# Patient Record
Sex: FEMALE | Race: BLACK OR AFRICAN AMERICAN | Employment: PART TIME | URBAN - METROPOLITAN AREA
[De-identification: names, ages, dates, MRNs, and addresses within clinical notes are randomized per-mention and may not be internally consistent; named-entity substitution may affect disease eponyms.]

---

## 2020-02-03 ENCOUNTER — OFFICE VISIT (OUTPATIENT)
Dept: OBGYN CLINIC | Age: 20
End: 2020-02-03

## 2020-02-03 ENCOUNTER — HOSPITAL ENCOUNTER (OUTPATIENT)
Dept: LAB | Age: 20
Discharge: HOME | End: 2020-02-03
Payer: COMMERCIAL

## 2020-02-03 VITALS
RESPIRATION RATE: 19 BRPM | HEIGHT: 66 IN | WEIGHT: 187 LBS | DIASTOLIC BLOOD PRESSURE: 75 MMHG | SYSTOLIC BLOOD PRESSURE: 113 MMHG | HEART RATE: 90 BPM | BODY MASS INDEX: 30.05 KG/M2

## 2020-02-03 DIAGNOSIS — Z3A.18 PREGNANCY WITH 18 COMPLETED WEEKS GESTATION: ICD-10-CM

## 2020-02-03 DIAGNOSIS — Z3A.18 PREGNANCY WITH 18 COMPLETED WEEKS GESTATION: Primary | ICD-10-CM

## 2020-02-03 PROBLEM — O09.32 LATE PRENATAL CARE AFFECTING PREGNANCY IN SECOND TRIMESTER: Status: ACTIVE | Noted: 2020-02-03

## 2020-02-03 LAB
ABO + RH BLD: NORMAL
ANTIBODY SCREEN, EXTERNAL: NEGATIVE
BASOPHILS # BLD: 0 K/UL (ref 0–0.1)
BASOPHILS NFR BLD: 0 % (ref 0–2)
BLOOD GROUP ANTIBODIES SERPL: NORMAL
DIFFERENTIAL METHOD BLD: NORMAL
EOSINOPHIL # BLD: 0.2 K/UL (ref 0–0.4)
EOSINOPHIL NFR BLD: 2 % (ref 0–5)
ERYTHROCYTE [DISTWIDTH] IN BLOOD BY AUTOMATED COUNT: 13.5 % (ref 11.6–14.5)
HBSAG, EXTERNAL: NEGATIVE
HCT VFR BLD AUTO: 39.6 % (ref 35–45)
HCT, EXTERNAL: 39.6
HGB BLD-MCNC: 13.6 G/DL (ref 12–16)
HGB EVAL, EXTERNAL: NEGATIVE
HGB, EXTERNAL: 13.6
HIV, EXTERNAL: NORMAL
LYMPHOCYTES # BLD: 3.2 K/UL (ref 0.9–3.6)
LYMPHOCYTES NFR BLD: 29 % (ref 21–52)
MCH RBC QN AUTO: 30.3 PG (ref 24–34)
MCHC RBC AUTO-ENTMCNC: 34.3 G/DL (ref 31–37)
MCV RBC AUTO: 88.2 FL (ref 74–97)
MONOCYTES # BLD: 1.1 K/UL (ref 0.05–1.2)
MONOCYTES NFR BLD: 10 % (ref 3–10)
NEUTS SEG # BLD: 6.7 K/UL (ref 1.8–8)
NEUTS SEG NFR BLD: 59 % (ref 40–73)
PLATELET # BLD AUTO: 330 K/UL (ref 135–420)
PLATELET CNT,   EXTERNAL: 330
PMV BLD AUTO: 10.6 FL (ref 9.2–11.8)
RBC # BLD AUTO: 4.49 M/UL (ref 4.2–5.3)
RPR, EXTERNAL: NORMAL
RUBELLA, EXTERNAL: NORMAL
SPECIMEN EXP DATE BLD: NORMAL
T. PALLIDUM, EXTERNAL: NORMAL
TYPE, ABO & RH, EXTERNAL: NORMAL
URINALYSIS, EXTERNAL: NO GROWTH
WBC # BLD AUTO: 11.2 K/UL (ref 4.6–13.2)

## 2020-02-03 PROCEDURE — 86900 BLOOD TYPING SEROLOGIC ABO: CPT

## 2020-02-03 PROCEDURE — 36415 COLL VENOUS BLD VENIPUNCTURE: CPT

## 2020-02-03 PROCEDURE — 83021 HEMOGLOBIN CHROMOTOGRAPHY: CPT

## 2020-02-03 PROCEDURE — 87389 HIV-1 AG W/HIV-1&-2 AB AG IA: CPT

## 2020-02-03 PROCEDURE — 85025 COMPLETE CBC W/AUTO DIFF WBC: CPT

## 2020-02-03 PROCEDURE — 87086 URINE CULTURE/COLONY COUNT: CPT

## 2020-02-03 PROCEDURE — 86780 TREPONEMA PALLIDUM: CPT

## 2020-02-03 PROCEDURE — 86762 RUBELLA ANTIBODY: CPT

## 2020-02-03 PROCEDURE — 87340 HEPATITIS B SURFACE AG IA: CPT

## 2020-02-03 NOTE — LETTER
[facility] DENTAL CLEARANCE LETTER 
 
 
 
 
 
2/3/2020 Luis Luna 2000 To whom it may concern: 
 
 
Luis Luna is an obstetric patient in my practice. She is cleared to have dental work with a double shield x-ray, lidocaine, and amoxicillin for treatment; and Tylenol #3 or Vicodin for pain. Should you require additional information please contact our office at [phone]. Sincerely, Hany Coleman MD

## 2020-02-03 NOTE — PROGRESS NOTES
24 y/o  presents to the office for missed menses. She states she has a known LMP and hasn't sought care anywhere else until now. She denies fetal movement, bleeding or pain. She is taking a PNV. Visit Vitals  /75   Pulse 90   Resp 19   Ht 5' 5.5\" (1.664 m)   Wt 187 lb (84.8 kg)   LMP 10/03/2019 (Exact Date)   Breastfeeding No   BMI 30.65 kg/m²     Pt declined exam and would like to have it done next visit. Will plan for morph at 20 weeks. Order placed. RTO 2 weeks.

## 2020-02-04 LAB
HBV SURFACE AG SER QL: <0.1 INDEX
HBV SURFACE AG SER QL: NEGATIVE
HIV 1+2 AB+HIV1 P24 AG SERPL QL IA: NONREACTIVE
HIV12 RESULT COMMENT, HHIVC: NORMAL
RUBV IGG SER-IMP: NORMAL
T PALLIDUM AB SER QL IA: NONREACTIVE

## 2020-02-05 LAB
BACTERIA SPEC CULT: NORMAL
DEPRECATED HGB OTHER BLD-IMP: 0 %
HGB A MFR BLD: 97.5 % (ref 96.4–98.8)
HGB A2 MFR BLD COLUMN CHROM: 2.5 % (ref 1.8–3.2)
HGB C MFR BLD: 0 %
HGB F MFR BLD: 0 % (ref 0–2)
HGB FRACT BLD-IMP: NORMAL
HGB S BLD QL SOLY: NEGATIVE
HGB S MFR BLD: 0 %
SERVICE CMNT-IMP: NORMAL

## 2020-02-21 ENCOUNTER — HOSPITAL ENCOUNTER (OUTPATIENT)
Dept: LAB | Age: 20
Discharge: HOME | End: 2020-02-21
Payer: COMMERCIAL

## 2020-02-21 ENCOUNTER — ROUTINE PRENATAL (OUTPATIENT)
Dept: OBGYN CLINIC | Age: 20
End: 2020-02-21

## 2020-02-21 VITALS
WEIGHT: 190 LBS | HEIGHT: 66 IN | RESPIRATION RATE: 17 BRPM | TEMPERATURE: 98.8 F | BODY MASS INDEX: 30.53 KG/M2 | DIASTOLIC BLOOD PRESSURE: 80 MMHG | SYSTOLIC BLOOD PRESSURE: 129 MMHG | HEART RATE: 97 BPM

## 2020-02-21 DIAGNOSIS — Z3A.20 20 WEEKS GESTATION OF PREGNANCY: ICD-10-CM

## 2020-02-21 DIAGNOSIS — Z3A.20 20 WEEKS GESTATION OF PREGNANCY: Primary | ICD-10-CM

## 2020-02-21 DIAGNOSIS — Z34.82 ENCOUNTER FOR SUPERVISION OF OTHER NORMAL PREGNANCY IN SECOND TRIMESTER: ICD-10-CM

## 2020-02-21 PROCEDURE — 87661 TRICHOMONAS VAGINALIS AMPLIF: CPT

## 2020-02-21 NOTE — PROGRESS NOTES
PRENATAL INTAKE SUMMARY    Ms. Jt Edmonds presents today for her first prenatal visit. She has no concerns. She states she does feel fetal movement. She had a confirmatory ultrasound last visit as well as labs. She is a late entrant to care. OB History        2    Para        Term                AB   1    Living           SAB        TAB   1    Ectopic        Molar        Multiple        Live Births                  I have reviewed the patient's medical, obstetrical, social, and family histories, medications, and available lab results. Subjective:   She has no unusual complaints    Objective:   Initial Physical Exam (New OB)    GENERAL APPEARANCE: alert, well appearing, in no apparent distress  HEAD: normocephalic, atraumatic  MOUTH: mucous membranes moist, pharynx normal without lesions  THYROID: no thyromegaly or masses present  BREASTS: no masses noted, no significant tenderness, no palpable axillary nodes, no skin changes  LUNGS: clear to auscultation, no wheezes, rales or rhonchi, symmetric air entry  HEART: regular rate and rhythm, no murmurs  ABDOMEN: soft, nontender, nondistended, no abnormal masses, no epigastric pain and FHT present  EXTREMITIES: no redness or tenderness in the calves or thighs, no edema  SKIN: normal coloration and turgor, no rashes  LYMPH NODES: no adenopathy palpable  NEUROLOGIC: alert, oriented, normal speech, no focal findings or movement disorder noted    PELVIC EXAM: EXTERNAL GENITALIA: normal appearing vulva with no masses, tenderness or lesions  VAGINA: no abnormal discharge or lesions  CERVIX: no lesions or cervical motion tenderness  UTERUS: gravid and consistent with 20 weeks  ADNEXA: no masses palpable and nontender  OB EXAM PELVIMETRY: appears adequate    Assessment/Plan:   Normal pregnancy  PNLs reviewed and normal.  Pt will consider genetic testing. Morph scheduled for   RTO 4 weeks.     Routine Prenatal care    ICD-10-CM ICD-9-CM    1. 20 weeks gestation of pregnancy Z3A.20 V22.2 CHLAMYDIA/NEISSERIA/TRICHOMONAS AMP

## 2020-02-24 LAB
C TRACH RRNA SPEC QL NAA+PROBE: NEGATIVE
N GONORRHOEA RRNA SPEC QL NAA+PROBE: NEGATIVE
SPECIMEN SOURCE: NORMAL
T VAGINALIS RRNA SPEC QL NAA+PROBE: NEGATIVE

## 2020-03-20 ENCOUNTER — ROUTINE PRENATAL (OUTPATIENT)
Dept: OBGYN CLINIC | Age: 20
End: 2020-03-20

## 2020-03-20 VITALS
HEART RATE: 89 BPM | RESPIRATION RATE: 20 BRPM | SYSTOLIC BLOOD PRESSURE: 136 MMHG | WEIGHT: 199 LBS | DIASTOLIC BLOOD PRESSURE: 77 MMHG | BODY MASS INDEX: 31.98 KG/M2 | HEIGHT: 66 IN

## 2020-03-20 DIAGNOSIS — Z3A.24 PREGNANCY WITH 24 COMPLETED WEEKS GESTATION: Primary | ICD-10-CM

## 2020-03-20 NOTE — LETTER
.Pregnancy Confirmation Letter [facility]20/[address] [facility] [facility] [address] [id number] Kalin Reyes is an OB patient at [facility]. LMP was 10. 3.2019 with an @[facility] 7-9-2020. She is 24.1 weeks pregnant on this date. Sincerely, Aurora De La Rosa MD

## 2020-03-20 NOTE — PROGRESS NOTES
Chief Complaint   Patient presents with    Routine Prenatal Visit     Visit Vitals  /77   Pulse 89   Resp 20   Ht 5' 5.5\" (1.664 m)   Wt 199 lb (90.3 kg)   LMP 10/03/2019 (Exact Date)   BMI 32.61 kg/m²     Results for orders placed or performed during the hospital encounter of 02/21/20   CHLAMYDIA/NEISSERIA/TRICHOMONAS AMP   Result Value Ref Range    Chlamydia amplification NEGATIVE  NEG      N. gonorrhoeae amplification NEGATIVE  NEG      Trichomonas amplification NEGATIVE  NEG      Specimen Source VAGINAL       FLU VACCINE OFFERED ,PATIENT DECLINED

## 2020-03-20 NOTE — PROGRESS NOTES
24w1d.  No Ob issues. Denies LOF/VB  Noted weight gain- discussed dietary choices. Glucola and CBC next visit. PNLs reviewed and normal.  RTO 4 weeks.

## 2020-03-24 ENCOUNTER — TELEPHONE (OUTPATIENT)
Dept: OBGYN CLINIC | Age: 20
End: 2020-03-24

## 2020-03-24 ENCOUNTER — ROUTINE PRENATAL (OUTPATIENT)
Dept: OBGYN CLINIC | Age: 20
End: 2020-03-24

## 2020-03-24 VITALS
RESPIRATION RATE: 18 BRPM | HEIGHT: 66 IN | TEMPERATURE: 98.4 F | HEART RATE: 76 BPM | SYSTOLIC BLOOD PRESSURE: 119 MMHG | DIASTOLIC BLOOD PRESSURE: 68 MMHG | WEIGHT: 199 LBS | BODY MASS INDEX: 31.98 KG/M2

## 2020-03-24 DIAGNOSIS — O46.92 VAGINAL BLEEDING IN PREGNANCY, SECOND TRIMESTER: Primary | ICD-10-CM

## 2020-03-24 NOTE — PROGRESS NOTES
22 y/o GP0 @ 24w5d presents to the office as an emergent visit. She was seen at [facility] earlier today with complaint of painless vaginal bleeding. She states she woke up to a clot when she wiped and saw more in the toliet. She had a normal NST and was discharged. She denies any sexual activity, trauma, MVA or un-usual activity. Visit Vitals  /68   Pulse 76   Temp 98.4 °F (36.9 °C) (Oral)   Resp 18   Ht 5' 5.5\" (1.664 m)   Wt 199 lb (90.3 kg)   LMP 10/03/2019 (Exact Date)   BMI 32.61 kg/m²     SVE: NEFG, no blood on perineum. Noted normal appearing closed cervix. No blood noted. Pink tinged discharge noted on q-tip provocation. BME: cervix is closed    Ultrasound: single IUP; placenta anterior and fundal.    Cervix seen and 3.81 cm    A/P: Resolved vaginal bleeding at 24w5d. Placenta appears normal.  Bleeding most likely cervical.  Will monitor conservatively. Recommend pelvic rest for 1 week. RTO if bleeding recurrs. RTO as scheduled.

## 2020-03-24 NOTE — TELEPHONE ENCOUNTER
Patient called to notify her provider that she heading to [facility] because she is bleeding

## 2020-04-20 ENCOUNTER — ROUTINE PRENATAL (OUTPATIENT)
Dept: OBGYN CLINIC | Age: 20
End: 2020-04-20

## 2020-04-20 ENCOUNTER — HOSPITAL ENCOUNTER (OUTPATIENT)
Dept: LAB | Age: 20
Discharge: HOME | End: 2020-04-20
Payer: COMMERCIAL

## 2020-04-20 VITALS
HEIGHT: 65 IN | OXYGEN SATURATION: 99 % | TEMPERATURE: 99.4 F | WEIGHT: 206.4 LBS | HEART RATE: 97 BPM | RESPIRATION RATE: 20 BRPM | DIASTOLIC BLOOD PRESSURE: 81 MMHG | SYSTOLIC BLOOD PRESSURE: 124 MMHG | BODY MASS INDEX: 34.39 KG/M2

## 2020-04-20 DIAGNOSIS — Z3A.28 28 WEEKS GESTATION OF PREGNANCY: ICD-10-CM

## 2020-04-20 DIAGNOSIS — Z3A.28 28 WEEKS GESTATION OF PREGNANCY: Primary | ICD-10-CM

## 2020-04-20 LAB
BASOPHILS # BLD: 0 K/UL (ref 0–0.1)
BASOPHILS NFR BLD: 0 % (ref 0–2)
DIFFERENTIAL METHOD BLD: ABNORMAL
EOSINOPHIL # BLD: 0.1 K/UL (ref 0–0.4)
EOSINOPHIL NFR BLD: 1 % (ref 0–5)
ERYTHROCYTE [DISTWIDTH] IN BLOOD BY AUTOMATED COUNT: 13.3 % (ref 11.6–14.5)
GLUCOSE 1H P 100 G GLC PO SERPL-MCNC: 100 MG/DL (ref 60–140)
HCT VFR BLD AUTO: 37.4 % (ref 35–45)
HGB BLD-MCNC: 12.6 G/DL (ref 12–16)
LYMPHOCYTES # BLD: 2.2 K/UL (ref 0.9–3.6)
LYMPHOCYTES NFR BLD: 20 % (ref 21–52)
MCH RBC QN AUTO: 30.9 PG (ref 24–34)
MCHC RBC AUTO-ENTMCNC: 33.7 G/DL (ref 31–37)
MCV RBC AUTO: 91.7 FL (ref 74–97)
MONOCYTES # BLD: 0.7 K/UL (ref 0.05–1.2)
MONOCYTES NFR BLD: 7 % (ref 3–10)
NEUTS SEG # BLD: 7.7 K/UL (ref 1.8–8)
NEUTS SEG NFR BLD: 72 % (ref 40–73)
PLATELET # BLD AUTO: 336 K/UL (ref 135–420)
PMV BLD AUTO: 10.4 FL (ref 9.2–11.8)
RBC # BLD AUTO: 4.08 M/UL (ref 4.2–5.3)
WBC # BLD AUTO: 10.7 K/UL (ref 4.6–13.2)

## 2020-04-20 PROCEDURE — 85025 COMPLETE CBC W/AUTO DIFF WBC: CPT

## 2020-04-20 PROCEDURE — 36415 COLL VENOUS BLD VENIPUNCTURE: CPT

## 2020-04-20 PROCEDURE — 82950 GLUCOSE TEST: CPT

## 2020-04-20 NOTE — PROGRESS NOTES
Honey Feldman presents today for   Chief Complaint   Patient presents with    Routine Prenatal Visit       Is someone accompanying this pt? no  Patient does not have any concerns at this time. Patient no birth control at this time . Depression screening     Is the patient using any DME equipment during OV? no    Depression Screening:  No flowsheet data found. Learning Assessment:  Learning Assessment 2/3/2020   PRIMARY LEARNER Patient   BARRIERS PRIMARY LEARNER NONE   CO-LEARNER CAREGIVER No   PRIMARY LANGUAGE ENGLISH   LEARNER PREFERENCE PRIMARY LISTENING   ANSWERED BY PATIENT   RELATIONSHIP SELF       Abuse Screening:  No flowsheet data found. Fall Risk  No flowsheet data found. This Visit Test  Results for orders placed or performed during the hospital encounter of 02/21/20   CHLAMYDIA/NEISSERIA/TRICHOMONAS AMP   Result Value Ref Range    Chlamydia amplification NEGATIVE  NEG      N. gonorrhoeae amplification NEGATIVE  NEG      Trichomonas amplification NEGATIVE  NEG      Specimen Source VAGINAL         Health Maintenance reviewed and discussed and ordered per Provider. Health Maintenance Due   Topic Date Due    HPV Age 9Y-34Y (1 - 2-dose series) 12/23/2011    OB 3RD TRIMESTER TDAP  04/09/2020   . Coordination of Care:  1. Have you been to the ER, urgent care clinic since your last visit? Hospitalized since your last visit? no    2. Have you seen or consulted any other health care providers outside of the [address] [place] [address] since your last visit? Include any pap smears or colon screening.  no  Visit Vitals  Resp 20   Ht 5' 5\" (1.651 m)   LMP 10/03/2019 (Exact Date)   SpO2 99%   BMI 33.12 kg/m²     206.4lbs

## 2020-05-04 ENCOUNTER — ROUTINE PRENATAL (OUTPATIENT)
Dept: OBGYN CLINIC | Age: 20
End: 2020-05-04

## 2020-05-04 VITALS
RESPIRATION RATE: 18 BRPM | BODY MASS INDEX: 35.16 KG/M2 | DIASTOLIC BLOOD PRESSURE: 76 MMHG | SYSTOLIC BLOOD PRESSURE: 122 MMHG | HEART RATE: 87 BPM | WEIGHT: 211 LBS | HEIGHT: 65 IN | OXYGEN SATURATION: 98 % | TEMPERATURE: 95.6 F

## 2020-05-04 DIAGNOSIS — Z3A.30 PREGNANCY WITH 30 COMPLETED WEEKS GESTATION: Primary | ICD-10-CM

## 2020-05-04 NOTE — PROGRESS NOTES
Chief Complaint   Patient presents with    Routine Prenatal Visit     rm 3     Visit Vitals  /76   Pulse 87   Temp 95.6 °F (35.3 °C) (Tympanic)   Resp 18   Ht 5' 5\" (1.651 m)   Wt 211 lb (95.7 kg)   LMP 10/03/2019 (Exact Date)   SpO2 98%   BMI 35.11 kg/m²     Results for orders placed or performed during the hospital encounter of 04/20/20   GLUCOSE, GESTATIONAL 1 HR TOLERANCE   Result Value Ref Range    GESTATIONAL GLUC 1HR 100 60 - 140 MG/DL   CBC WITH AUTOMATED DIFF   Result Value Ref Range    WBC 10.7 4.6 - 13.2 K/uL    RBC 4.08 (L) 4.20 - 5.30 M/uL    HGB 12.6 12.0 - 16.0 g/dL    HCT 37.4 35.0 - 45.0 %    MCV 91.7 74.0 - 97.0 FL    MCH 30.9 24.0 - 34.0 PG    MCHC 33.7 31.0 - 37.0 g/dL    RDW 13.3 11.6 - 14.5 %    PLATELET 964 687 - 444 K/uL    MPV 10.4 9.2 - 11.8 FL    NEUTROPHILS 72 40 - 73 %    LYMPHOCYTES 20 (L) 21 - 52 %    MONOCYTES 7 3 - 10 %    EOSINOPHILS 1 0 - 5 %    BASOPHILS 0 0 - 2 %    ABS. NEUTROPHILS 7.7 1.8 - 8.0 K/UL    ABS. LYMPHOCYTES 2.2 0.9 - 3.6 K/UL    ABS. MONOCYTES 0.7 0.05 - 1.2 K/UL    ABS. EOSINOPHILS 0.1 0.0 - 0.4 K/UL    ABS. BASOPHILS 0.0 0.0 - 0.1 K/UL    DF AUTOMATED      1. Have you been to the ER, urgent care clinic since your last visit? Hospitalized since your last visit? No    2. Have you seen or consulted any other health care providers outside of the [address] [place] [address] since your last visit? Include any pap smears or colon screening.  No

## 2020-05-04 NOTE — PATIENT INSTRUCTIONS

## 2020-05-26 ENCOUNTER — ROUTINE PRENATAL (OUTPATIENT)
Dept: OBGYN CLINIC | Age: 20
End: 2020-05-26

## 2020-05-26 VITALS
DIASTOLIC BLOOD PRESSURE: 84 MMHG | WEIGHT: 215 LBS | BODY MASS INDEX: 35.82 KG/M2 | SYSTOLIC BLOOD PRESSURE: 136 MMHG | HEIGHT: 65 IN | HEART RATE: 110 BPM

## 2020-05-26 DIAGNOSIS — Z3A.33 PREGNANCY WITH 33 COMPLETED WEEKS GESTATION: Primary | ICD-10-CM

## 2020-05-26 NOTE — PROGRESS NOTES
Chief Complaint   Patient presents with    Routine Prenatal Visit     Visit Vitals  /84   Pulse (!) 110   Ht 5' 5\" (1.651 m)   Wt 215 lb (97.5 kg)   LMP 10/03/2019 (Exact Date)   BMI 35.78 kg/m²     Results for orders placed or performed during the hospital encounter of 04/20/20   GLUCOSE, GESTATIONAL 1 HR TOLERANCE   Result Value Ref Range    GESTATIONAL GLUC 1HR 100 60 - 140 MG/DL   CBC WITH AUTOMATED DIFF   Result Value Ref Range    WBC 10.7 4.6 - 13.2 K/uL    RBC 4.08 (L) 4.20 - 5.30 M/uL    HGB 12.6 12.0 - 16.0 g/dL    HCT 37.4 35.0 - 45.0 %    MCV 91.7 74.0 - 97.0 FL    MCH 30.9 24.0 - 34.0 PG    MCHC 33.7 31.0 - 37.0 g/dL    RDW 13.3 11.6 - 14.5 %    PLATELET 781 613 - 255 K/uL    MPV 10.4 9.2 - 11.8 FL    NEUTROPHILS 72 40 - 73 %    LYMPHOCYTES 20 (L) 21 - 52 %    MONOCYTES 7 3 - 10 %    EOSINOPHILS 1 0 - 5 %    BASOPHILS 0 0 - 2 %    ABS. NEUTROPHILS 7.7 1.8 - 8.0 K/UL    ABS. LYMPHOCYTES 2.2 0.9 - 3.6 K/UL    ABS. MONOCYTES 0.7 0.05 - 1.2 K/UL    ABS. EOSINOPHILS 0.1 0.0 - 0.4 K/UL    ABS. BASOPHILS 0.0 0.0 - 0.1 K/UL    DF AUTOMATED       .

## 2020-06-15 ENCOUNTER — HOSPITAL ENCOUNTER (OUTPATIENT)
Dept: LAB | Age: 20
Discharge: HOME | End: 2020-06-15
Payer: COMMERCIAL

## 2020-06-15 ENCOUNTER — ROUTINE PRENATAL (OUTPATIENT)
Dept: OBGYN CLINIC | Age: 20
End: 2020-06-15

## 2020-06-15 VITALS
RESPIRATION RATE: 18 BRPM | BODY MASS INDEX: 36.82 KG/M2 | WEIGHT: 221 LBS | DIASTOLIC BLOOD PRESSURE: 83 MMHG | SYSTOLIC BLOOD PRESSURE: 131 MMHG | HEART RATE: 103 BPM | TEMPERATURE: 97.2 F | HEIGHT: 65 IN

## 2020-06-15 DIAGNOSIS — Z3A.36 PREGNANCY WITH 36 COMPLETED WEEKS GESTATION: ICD-10-CM

## 2020-06-15 DIAGNOSIS — Z3A.36 PREGNANCY WITH 36 COMPLETED WEEKS GESTATION: Primary | ICD-10-CM

## 2020-06-15 PROCEDURE — 87661 TRICHOMONAS VAGINALIS AMPLIF: CPT

## 2020-06-15 PROCEDURE — 87081 CULTURE SCREEN ONLY: CPT

## 2020-06-15 NOTE — PROGRESS NOTES
Chief Complaint   Patient presents with    Routine Prenatal Visit     ,vs  Results for orders placed or performed during the hospital encounter of 04/20/20   GLUCOSE, GESTATIONAL 1 HR TOLERANCE   Result Value Ref Range    GESTATIONAL GLUC 1HR 100 60 - 140 MG/DL   CBC WITH AUTOMATED DIFF   Result Value Ref Range    WBC 10.7 4.6 - 13.2 K/uL    RBC 4.08 (L) 4.20 - 5.30 M/uL    HGB 12.6 12.0 - 16.0 g/dL    HCT 37.4 35.0 - 45.0 %    MCV 91.7 74.0 - 97.0 FL    MCH 30.9 24.0 - 34.0 PG    MCHC 33.7 31.0 - 37.0 g/dL    RDW 13.3 11.6 - 14.5 %    PLATELET 465 833 - 335 K/uL    MPV 10.4 9.2 - 11.8 FL    NEUTROPHILS 72 40 - 73 %    LYMPHOCYTES 20 (L) 21 - 52 %    MONOCYTES 7 3 - 10 %    EOSINOPHILS 1 0 - 5 %    BASOPHILS 0 0 - 2 %    ABS. NEUTROPHILS 7.7 1.8 - 8.0 K/UL    ABS. LYMPHOCYTES 2.2 0.9 - 3.6 K/UL    ABS. MONOCYTES 0.7 0.05 - 1.2 K/UL    ABS. EOSINOPHILS 0.1 0.0 - 0.4 K/UL    ABS.  BASOPHILS 0.0 0.0 - 0.1 K/UL    DF AUTOMATED

## 2020-06-15 NOTE — PROGRESS NOTES
36w4d. No OB issues. Denies lOF/VB  GBS and cultures done today. SVE: C//soft  Labor precautions reviewed. RTO 1 week.

## 2020-06-19 LAB
BACTERIA SPEC CULT: NORMAL
SERVICE CMNT-IMP: NORMAL

## 2020-06-29 ENCOUNTER — ROUTINE PRENATAL (OUTPATIENT)
Dept: OBGYN CLINIC | Age: 20
End: 2020-06-29

## 2020-06-29 VITALS
DIASTOLIC BLOOD PRESSURE: 81 MMHG | TEMPERATURE: 97.6 F | SYSTOLIC BLOOD PRESSURE: 121 MMHG | WEIGHT: 226 LBS | BODY MASS INDEX: 37.61 KG/M2 | HEART RATE: 106 BPM

## 2020-06-29 DIAGNOSIS — Z3A.38 PREGNANCY WITH 38 COMPLETED WEEKS GESTATION: Primary | ICD-10-CM

## 2020-06-29 NOTE — PROGRESS NOTES
Chief Complaint   Patient presents with    Routine Prenatal Visit     Visit Vitals  /81   Pulse (!) 106   Temp 97.6 °F (36.4 °C) (Tympanic)   Wt 226 lb (102.5 kg)   LMP 10/03/2019 (Exact Date)   BMI 37.61 kg/m²     Results for orders placed or performed during the hospital encounter of 06/15/20   CULTURE, GENITAL GROUP B STREP   Result Value Ref Range    Special Requests: NO SPECIAL REQUESTS      Culture result: NO GROUP B BETA STREPTOCOCCUS ISOLATED

## 2020-06-29 NOTE — PROGRESS NOTES
38w4d. No OB issues. Notes intermittent contractions. Denies LOF/VB  Labor precautions. RTO 1 week. EFW ultrasound at that visit.

## 2020-07-07 ENCOUNTER — CLINICAL SUPPORT (OUTPATIENT)
Dept: OBGYN CLINIC | Age: 20
End: 2020-07-07

## 2020-07-07 ENCOUNTER — ROUTINE PRENATAL (OUTPATIENT)
Dept: OBGYN CLINIC | Age: 20
End: 2020-07-07

## 2020-07-07 VITALS
TEMPERATURE: 95 F | HEART RATE: 97 BPM | SYSTOLIC BLOOD PRESSURE: 135 MMHG | HEIGHT: 65 IN | WEIGHT: 227 LBS | DIASTOLIC BLOOD PRESSURE: 88 MMHG | BODY MASS INDEX: 37.82 KG/M2

## 2020-07-07 DIAGNOSIS — Z3A.38 PREGNANCY WITH 38 COMPLETED WEEKS GESTATION: ICD-10-CM

## 2020-07-07 DIAGNOSIS — Z3A.39 39 WEEKS GESTATION OF PREGNANCY: Primary | ICD-10-CM

## 2020-07-07 LAB
BILIRUB UR QL STRIP: NORMAL
GLUCOSE UR-MCNC: NORMAL MG/DL
KETONES P FAST UR STRIP-MCNC: NORMAL MG/DL
PH UR STRIP: NORMAL [PH] (ref 4.6–8)
PROT UR QL STRIP: NORMAL
SP GR UR STRIP: NORMAL (ref 1–1.03)
UA UROBILINOGEN AMB POC: NORMAL (ref 0.2–1)
URINALYSIS CLARITY POC: NORMAL
URINALYSIS COLOR POC: NORMAL
URINE BLOOD POC: NORMAL
URINE LEUKOCYTES POC: NORMAL
URINE NITRITES POC: NORMAL

## 2020-07-07 NOTE — PROGRESS NOTES
Chief Complaint   Patient presents with    Routine Prenatal Visit     room 6     Visit Vitals  /88   Pulse 97   Temp (!) 95 °F (35 °C) (Tympanic)   Ht 5' 5\" (1.651 m)   Wt 227 lb (103 kg)   LMP 10/03/2019 (Exact Date)   BMI 37.77 kg/m²     Results for orders placed or performed during the hospital encounter of 06/15/20   CULTURE, GENITAL GROUP B STREP   Result Value Ref Range    Special Requests: NO SPECIAL REQUESTS      Culture result: NO GROUP B BETA STREPTOCOCCUS ISOLATED       1. Have you been to the ER, urgent care clinic since your last visit? Hospitalized since your last visit? No    2. Have you seen or consulted any other health care providers outside of the [address] [place] [address] since your last visit? Include any pap smears or colon screening.  No

## 2020-07-07 NOTE — PROGRESS NOTES
Ms. Jose Alberto Mackey is a   39w5d with Estimated Date of Delivery: 20 who presents to the office for a routine prenatal visit. She denies contractions, leakage of fluid, or vaginal bleeding. She reports normal fetal movement. Visit Vitals  /88   Pulse 97   Temp (!) 95 °F (35 °C) (Tympanic)   Ht 5' 5\" (1.651 m)   Wt 227 lb (103 kg)   LMP 10/03/2019 (Exact Date)   BMI 37.77 kg/m²     SVE: 0/40/-3    A/P  23 y.o. with an IUP at 39w5d here for a routine prenatal visit. 1. Continue routine prenatal care  2. Strict 3rd trimester precautions given. Advised regarding leakage of fluid, contractions, and vaginal bleeding. Fetal kick count instructions given. 3. F/U in 1 week  4. Normal growth U/S. TERE normal. Vertex. EFW 29%.

## 2020-07-14 ENCOUNTER — ROUTINE PRENATAL (OUTPATIENT)
Dept: OBGYN CLINIC | Age: 20
End: 2020-07-14

## 2020-07-14 VITALS
DIASTOLIC BLOOD PRESSURE: 84 MMHG | SYSTOLIC BLOOD PRESSURE: 134 MMHG | WEIGHT: 230 LBS | BODY MASS INDEX: 38.32 KG/M2 | HEART RATE: 103 BPM | HEIGHT: 65 IN

## 2020-07-14 DIAGNOSIS — Z3A.40 40 WEEKS GESTATION OF PREGNANCY: Primary | ICD-10-CM

## 2020-07-14 LAB
BILIRUB UR QL STRIP: NEGATIVE
GLUCOSE UR-MCNC: NEGATIVE MG/DL
KETONES P FAST UR STRIP-MCNC: NEGATIVE MG/DL
PH UR STRIP: 7 [PH] (ref 4.6–8)
PROT UR QL STRIP: NEGATIVE
SP GR UR STRIP: 1.02 (ref 1–1.03)
UA UROBILINOGEN AMB POC: NORMAL (ref 0.2–1)
URINALYSIS CLARITY POC: CLEAR
URINALYSIS COLOR POC: YELLOW
URINE BLOOD POC: NORMAL
URINE LEUKOCYTES POC: NORMAL
URINE NITRITES POC: NEGATIVE

## 2020-07-14 NOTE — PROGRESS NOTES
Chief Complaint   Patient presents with    Routine Prenatal Visit     Visit Vitals  /84   Pulse (!) 103   Ht 5' 5\" (1.651 m)   Wt 230 lb (104.3 kg)   LMP 10/03/2019 (Exact Date)   BMI 38.27 kg/m²     Results for orders placed or performed in visit on 07/07/20   AMB POC URINALYSIS DIP STICK AUTO W/O MICRO   Result Value Ref Range    Color (UA POC)      Clarity (UA POC)      Glucose (UA POC)      Bilirubin (UA POC)      Ketones (UA POC)      Specific gravity (UA POC)      Blood (UA POC)      pH (UA POC)      Protein (UA POC)      Urobilinogen (UA POC)      Nitrites (UA POC)      Leukocyte esterase (UA POC)

## 2020-07-14 NOTE — PROGRESS NOTES
40w5d.  No OB issues. Normal FM. Denies LOF/Vb  NST today- Category 1. IOL requested for 7/16  Labor precautions reviewed.

## 2020-07-30 ENCOUNTER — ROUTINE PRENATAL (OUTPATIENT)
Dept: OBGYN CLINIC | Age: 20
End: 2020-07-30

## 2020-07-30 VITALS
WEIGHT: 208 LBS | HEART RATE: 91 BPM | HEIGHT: 65 IN | SYSTOLIC BLOOD PRESSURE: 118 MMHG | BODY MASS INDEX: 34.66 KG/M2 | RESPIRATION RATE: 18 BRPM | TEMPERATURE: 98.6 F | DIASTOLIC BLOOD PRESSURE: 79 MMHG

## 2020-07-30 NOTE — PROGRESS NOTES
Patient here for incision check- doing well, no concerns- incision site clean/dry/intact f/u for 6 week postpartum visit.

## 2020-08-27 ENCOUNTER — ROUTINE PRENATAL (OUTPATIENT)
Dept: OBGYN CLINIC | Age: 20
End: 2020-08-27

## 2020-08-27 VITALS
HEART RATE: 76 BPM | BODY MASS INDEX: 35.02 KG/M2 | SYSTOLIC BLOOD PRESSURE: 133 MMHG | HEIGHT: 65 IN | OXYGEN SATURATION: 99 % | WEIGHT: 210.2 LBS | TEMPERATURE: 97.3 F | RESPIRATION RATE: 20 BRPM | DIASTOLIC BLOOD PRESSURE: 84 MMHG

## 2020-08-27 NOTE — PROGRESS NOTES
Doing well postpartum. [place] depression scale negative. Would like the Nexplanon for birth control- uterine bleeding has stopped. Pt. Given contact information for Dm Calvo for Nexplanon placement.

## 2020-08-27 NOTE — PROGRESS NOTES
1. Have you been to the ER, urgent care clinic since your last visit? Hospitalized since your last visit? No    2. Have you seen or consulted any other health care providers outside of the [address] [place] [address] since your last visit? Include any pap smears or colon screening.  No     Visit Vitals  /84   Pulse 76   Temp 97.3 °F (36.3 °C) (Temporal)   Resp 20   Ht 5' 5\" (1.651 m)   Wt 210 lb 3.2 oz (95.3 kg)   LMP 10/03/2019 (Exact Date)   SpO2 99%   BMI 34.98 kg/m²    Depression Scale  In the past 7 days:  I have been able to laugh and see the funny side of things[de-identified] As much as I always could  I have looked forward with enjoyment to things: As much as I ever did  I have blamed myself unnecessarily when things went wrong: Not very often  I have been anxious or worried for no good reason: No, not at all  I have felt scared or panicky for no good reason: No, not at all  Things have been getting on top of me: No, I have been coping as well as ever  I have been so unhappy that I have had difficulty sleeping: No, not at all  I have felt sad or miserable: No, not at all  I have been so unhappy that I have been crying: No, never  The thought of harming myself has occured to me: Never  [facility]  Depression Scale (EPDS)  [place]  Depression Score: 1

## 2022-03-19 PROBLEM — O09.32 LATE PRENATAL CARE AFFECTING PREGNANCY IN SECOND TRIMESTER: Status: ACTIVE | Noted: 2020-02-03
